# Patient Record
Sex: FEMALE
[De-identification: names, ages, dates, MRNs, and addresses within clinical notes are randomized per-mention and may not be internally consistent; named-entity substitution may affect disease eponyms.]

---

## 2021-05-24 PROBLEM — Z00.00 ENCOUNTER FOR PREVENTIVE HEALTH EXAMINATION: Status: ACTIVE | Noted: 2021-05-24

## 2021-05-25 ENCOUNTER — APPOINTMENT (OUTPATIENT)
Dept: NEUROLOGY | Facility: CLINIC | Age: 46
End: 2021-05-25
Payer: MEDICAID

## 2021-05-25 VITALS
WEIGHT: 226 LBS | DIASTOLIC BLOOD PRESSURE: 74 MMHG | OXYGEN SATURATION: 96 % | BODY MASS INDEX: 42.12 KG/M2 | HEART RATE: 80 BPM | SYSTOLIC BLOOD PRESSURE: 109 MMHG | TEMPERATURE: 97.3 F | HEIGHT: 61.5 IN

## 2021-05-25 DIAGNOSIS — M79.7 FIBROMYALGIA: ICD-10-CM

## 2021-05-25 DIAGNOSIS — R93.0 ABNORMAL FINDINGS ON DIAGNOSTIC IMAGING OF SKULL AND HEAD, NOT ELSEWHERE CLASSIFIED: ICD-10-CM

## 2021-05-25 DIAGNOSIS — E78.5 HYPERLIPIDEMIA, UNSPECIFIED: ICD-10-CM

## 2021-05-25 DIAGNOSIS — I10 ESSENTIAL (PRIMARY) HYPERTENSION: ICD-10-CM

## 2021-05-25 PROCEDURE — 99204 OFFICE O/P NEW MOD 45 MIN: CPT

## 2021-05-25 NOTE — HISTORY OF PRESENT ILLNESS
[FreeTextEntry1] : The patient is a 46 year old female with a PMH of chronic pain, fibromyalgia, degenerative joint disease, Diabetes Mellitus, empty sella, and headaches (?migraines). She is presenting for review of recent MRI brain results.\par \par The patient states she underwent MRI brain some time back to evaluate for pain. At that point, she was found ot have an empty sella. She was referred to endocrinology and ultimately underwent MRI of her pituitary. During this scan, she was found to have a "mild burden of chronic ischemic small vessel disease."  The patient has never had stroke/TIA symptoms.  She was seen by neurology elsewhere and told not to worry about these findings.\par \par The pateint does have HLD for which she is on atorvastatin 20. Recent LDL was 79, total 135 (12/2020). Recent HgA1c is 7.6 in 4/2021. She was previously on antihypertensive medication but is no longer. BP today is 110/74. She has limited exercise due to pain.

## 2021-05-25 NOTE — ASSESSMENT
[FreeTextEntry1] : The patient is a 46 year old female with multiple vascular risk factors presenting for follow-up after MRI brain showed what sounds like possibly evidence of small vessel disease.  We discussed what this means and that the best way to prevent further lesions is to optimize control of risk factors.  The patient plans to follow-up with PCP to consider BP medication though BP today is well controlled.  She should continue statin therapy and will work with her providers for optimal control of her diabetes.  She will send me the disc of images to review as I was only able to review the reports on her phone today.

## 2021-05-25 NOTE — PHYSICAL EXAM
[Person] : oriented to person [Place] : oriented to place [Time] : oriented to time [Concentration Intact] : normal concentrating ability [Visual Intact] : visual attention was ~T not ~L decreased [Fluency] : fluency intact [Comprehension] : comprehension intact [Reading] : reading intact [Past History] : adequate knowledge of personal past history [Cranial Nerves Optic (II)] : visual acuity intact bilaterally,  visual fields full to confrontation, pupils equal round and reactive to light [Cranial Nerves Oculomotor (III)] : extraocular motion intact [Cranial Nerves Trigeminal (V)] : facial sensation intact symmetrically [Cranial Nerves Facial (VII)] : face symmetrical [Cranial Nerves Vestibulocochlear (VIII)] : hearing was intact bilaterally [Cranial Nerves Glossopharyngeal (IX)] : tongue and palate midline [Cranial Nerves Accessory (XI - Cranial And Spinal)] : head turning and shoulder shrug symmetric [Cranial Nerves Hypoglossal (XII)] : there was no tongue deviation with protrusion [Motor Tone] : muscle tone was normal in all four extremities [Motor Strength] : muscle strength was normal in all four extremities [No Muscle Atrophy] : normal bulk in all four extremities [Motor Handedness Left-Handed] : the patient is left hand dominant [Sensation Tactile Decrease] : light touch was intact [Abnormal Walk] : normal gait [Balance] : balance was intact [2+] : Ankle jerk left 2+ [Past-pointing] : there was no past-pointing [Tremor] : no tremor present [Plantar Reflex Right Only] : normal on the right [Plantar Reflex Left Only] : normal on the left [FreeTextEntry6] : give-way tendency throughout; w max effort, strength is full

## 2021-10-14 ENCOUNTER — TRANSCRIPTION ENCOUNTER (OUTPATIENT)
Age: 46
End: 2021-10-14

## 2021-10-14 ENCOUNTER — APPOINTMENT (OUTPATIENT)
Dept: NEUROLOGY | Facility: CLINIC | Age: 46
End: 2021-10-14
Payer: MEDICAID

## 2021-10-14 ENCOUNTER — NON-APPOINTMENT (OUTPATIENT)
Age: 46
End: 2021-10-14

## 2021-10-14 VITALS
OXYGEN SATURATION: 96 % | HEIGHT: 61.5 IN | BODY MASS INDEX: 41.75 KG/M2 | HEART RATE: 70 BPM | WEIGHT: 224 LBS | TEMPERATURE: 97.5 F | SYSTOLIC BLOOD PRESSURE: 150 MMHG | DIASTOLIC BLOOD PRESSURE: 86 MMHG

## 2021-10-14 DIAGNOSIS — R26.9 UNSPECIFIED ABNORMALITIES OF GAIT AND MOBILITY: ICD-10-CM

## 2021-10-14 PROCEDURE — 99212 OFFICE O/P EST SF 10 MIN: CPT

## 2021-10-18 NOTE — PHYSICAL EXAM
[Oriented To Time, Place, And Person] : oriented to person, place, and time [General Appearance - Alert] : alert [Person] : oriented to person [Place] : oriented to place [Time] : oriented to time [Cranial Nerves Optic (II)] : visual acuity intact bilaterally,  visual fields full to confrontation, pupils equal round and reactive to light [Cranial Nerves Oculomotor (III)] : extraocular motion intact [Cranial Nerves Trigeminal (V)] : facial sensation intact symmetrically [Cranial Nerves Facial (VII)] : face symmetrical [Cranial Nerves Vestibulocochlear (VIII)] : hearing was intact bilaterally [Cranial Nerves Glossopharyngeal (IX)] : tongue and palate midline [Cranial Nerves Accessory (XI - Cranial And Spinal)] : head turning and shoulder shrug symmetric [Cranial Nerves Hypoglossal (XII)] : there was no tongue deviation with protrusion [Sensation Tactile Decrease] : light touch was intact [1+] : Ankle jerk left 1+ [FreeTextEntry1] : Patient is AAOx3, no acute distress, speech normal, no aphasia or dysarthria. She can ambulate with walker easily without any issue with maneuvering, but without the walker she tends to fall. Apparent weakness in all extremities in initial exam but it was absent on maximal effort. Neuro exam is extremely limited as the findings are not reproducable.

## 2021-10-18 NOTE — ASSESSMENT
[FreeTextEntry1] : 45 y/o F is here today for worsening gait and balance. She c/o having trouble following commands, and her sense of awareness of her surrounding is impaired. Neurological exam is extremely limited as the findings are not reproducible except generalized diminished reflexes.There's apparent weakness in all extremities, but it was not there on maximal effort, other exams also extremely limited due to patient's non-co operation.\par   results of MRI of brain, C-spine, L-spine doesn't explain her presentation. Patient has been requested to bring the CDs of the MRIs. Patient also requesting for letter to get a mobility animal.

## 2021-10-18 NOTE — HISTORY OF PRESENT ILLNESS
[FreeTextEntry1] : 47 y/o f with PMH of HTN, DM, empty Sella, fibromyalgia presented to clinic for follow up. She c/o worsening of her gait and balance causing her to fall 5 times in past 1 month. She states she is losing her perception/self awareness and sometimes have trouble following commands.She has been using walker to ambulate. She has brces on both knees and reports that she will need knee replacement surgery. She had imaging done in [place] she brought the reports but no the disc.

## 2021-12-02 ENCOUNTER — NON-APPOINTMENT (OUTPATIENT)
Age: 46
End: 2021-12-02

## 2021-12-20 ENCOUNTER — APPOINTMENT (OUTPATIENT)
Dept: NEUROLOGY | Facility: CLINIC | Age: 46
End: 2021-12-20
Payer: MEDICARE

## 2021-12-20 VITALS
SYSTOLIC BLOOD PRESSURE: 111 MMHG | HEIGHT: 61.5 IN | WEIGHT: 219 LBS | DIASTOLIC BLOOD PRESSURE: 73 MMHG | TEMPERATURE: 98.8 F | OXYGEN SATURATION: 98 % | BODY MASS INDEX: 40.82 KG/M2 | HEART RATE: 87 BPM

## 2021-12-20 DIAGNOSIS — E11.9 TYPE 2 DIABETES MELLITUS W/OUT COMPLICATIONS: ICD-10-CM

## 2021-12-20 DIAGNOSIS — E23.6 OTHER DISORDERS OF PITUITARY GLAND: ICD-10-CM

## 2021-12-20 PROCEDURE — 99213 OFFICE O/P EST LOW 20 MIN: CPT

## 2021-12-21 PROBLEM — E11.9 DIABETES: Status: ACTIVE | Noted: 2021-05-25

## 2021-12-21 PROBLEM — E23.6 EMPTY SELLA: Status: ACTIVE | Noted: 2021-05-25

## 2021-12-21 NOTE — ASSESSMENT
[FreeTextEntry1] : The patient is a 46 year old female with multiple vascular risk factors presenting for follow-up after MRI brain showed what sounds like possibly evidence of small vessel disease and empty sella.  For now she has multiple complaints, especially L eye droop and b/l vision changes which started 2 years ago but seems to have gotten worse... Will start with MRI head for now- to be done in [facility] or [facility] so we can have access to images. Also discussed tight DM control, as DM is her primary vascular risk factor. She assured me that she is working closely with her endo to achieve and maintain A1c goal <7.0. \par \par RTO after MRI, with attg.

## 2021-12-21 NOTE — HISTORY OF PRESENT ILLNESS
[FreeTextEntry1] : The patient is a 46 year old female with a PMH of chronic pain, fibromyalgia, degenerative joint disease, Diabetes Mellitus, empty sella, and headaches (?migraines). She is presenting for followup.\par \par MRI brain- found to have an empty sella as well as a mild burden of chronic ischemic small vessel disease.  She denies previous stroke/TIA symptoms.  She was seen by neurology elsewhere and told not to worry about these findings. Pt has only ever brought the reports of her previous MRIs. \par \par Pt reports L eye weakness and eye drooping. Feels it is hard to keep her left eye open. Not necessarily at the end of the day or associated with any fatigue. Also c/o intermittent double vision with both eyes open, as well as loss of her peripheral vision b/l. This has been going on for a couple of years. She has seen her opthomologist and reportedly everything was normal including eye pressures. \par \ankur Also has multiple other complaints- frequent mild headaches with +photophobia. Sometimes feeling of b/l arm weakness and b/l leg weakness. Also says she has no proprioception, and tends to walks into doors. Started having multiple falls 4 years ago. She does not currently do any PT. She denies any SOB/ difficulty swallowing/ specific muscle weakness/ paresthesias/ speech difficulties/ dysdiadochokinesias. None of her symptoms are worsened by fatigue or at the end of the day, but rather are intermittent and sporadic. \par \ankur Uses a walker for ambulation and is pending b/l knee replacements (hx [employer]latta as a child). \par \par DM was previously 13.1- now 7.1

## 2022-01-02 ENCOUNTER — APPOINTMENT (OUTPATIENT)
Dept: MRI IMAGING | Facility: CLINIC | Age: 47
End: 2022-01-02
Payer: MEDICAID

## 2022-01-02 ENCOUNTER — OUTPATIENT (OUTPATIENT)
Dept: OUTPATIENT SERVICES | Facility: HOSPITAL | Age: 47
LOS: 1 days | End: 2022-01-02

## 2022-01-02 ENCOUNTER — RESULT REVIEW (OUTPATIENT)
Age: 47
End: 2022-01-02

## 2022-01-02 PROCEDURE — 70551 MRI BRAIN STEM W/O DYE: CPT | Mod: 26

## 2022-01-03 ENCOUNTER — NON-APPOINTMENT (OUTPATIENT)
Age: 47
End: 2022-01-03

## 2022-01-24 ENCOUNTER — APPOINTMENT (OUTPATIENT)
Dept: NEUROLOGY | Facility: CLINIC | Age: 47
End: 2022-01-24
Payer: MEDICARE

## 2022-01-24 DIAGNOSIS — R51.9 HEADACHE, UNSPECIFIED: ICD-10-CM

## 2022-01-24 PROCEDURE — 99215 OFFICE O/P EST HI 40 MIN: CPT | Mod: 95

## 2022-01-24 NOTE — PHYSICAL EXAM
[FreeTextEntry1] : Exam is limited due to the nature of the telehealth visit.  The patient is alert, oriented to conversation.  Speech and language are intact.  She is wearing sunglasses. Cranial nerves are grossly normal apart from mild left ptosis/asymmetry. There is no apparent focal motor deficit of her limbs..\par

## 2022-01-24 NOTE — ASSESSMENT
[FreeTextEntry1] : The patient is a 46 year old female with a PMH of chronic pain, fibromyalgia, degenerative joint disease, diabetes mellitus, empty sella, and headaches and ptosis along with a variety of other chronic complaints. Etiology is unclear though I suspect fibromyalgia and functional neurologic disease are contributing factors. We will need to rule out organic, more dangerous conditions with MRA head/neck (aneurysm, dissection), particularly for ptosis/diplopia, and will also get myasthenia gravis labs, ESR/CRP. I will place a referral her to neuro-ophthalmology.  If negative, I think that is all reassuring. I have encouraged her engage with therapy. She is interested in a service animal and is asking me for the paperwork for a letter or support; she will send me the paper for my review. However, I have requested her PCP write the letter.\par

## 2022-01-24 NOTE — HISTORY OF PRESENT ILLNESS
[FreeTextEntry1] : The patient is a 46 year old female with a PMH of chronic pain, fibromyalgia, degenerative joint disease, Diabetes Mellitus, empty sella, obesity, headaches (?migraines), and recent COVID. She is presenting for followup.\par \par The patient was last seen in late December for follow-up of left eye drooping/weakness associated with intermittent diplopia (both vertical and horizontal, improves with blinking, artifical tears; does not resolve with one eye closure)/loss of peripheral vision. She often has associated swelling of the eyelid. She also reports a pressure/pain/"headache" behind the left eye. Symptoms has been ongoing off/on for a few years (2-4 years) without known trigger. She feels like overall, her symptoms have been worsening. She notes she has always had a "lazy" eye but this is different. She is unsure if it is associated with fatigue or if it is progressive throughout the day (ie: no definitive worsening with straining, prolonged staring, etc). She doesn't really notice it until it is closed or the droopiness is pointed out to her. She was evaluated by optometry and ophthalmology who did not have a definitive diagnosis and exam was "fine". Recent MRI brain appeared stable compared to prior scans. \par \par Otherwise, she continues to have multiple other complaints. She has constant photophobia and headaches.  Her imbalance and falls continue to occur. ENT evaluation was given vestibular therapy but she has not yet done this. Notably, review of results of MRI C, L spine showed moderate spinal canal stenosis at C3-4, C4-5, foraminal stenosis at C6-7, and moderate bilateral facet arthrosis L4-5, L5-S1- no symptoms to explain her symptoms. \par

## 2022-02-04 ENCOUNTER — APPOINTMENT (OUTPATIENT)
Dept: MRI IMAGING | Facility: CLINIC | Age: 47
End: 2022-02-04
Payer: MEDICARE

## 2022-02-04 ENCOUNTER — OUTPATIENT (OUTPATIENT)
Dept: OUTPATIENT SERVICES | Facility: HOSPITAL | Age: 47
LOS: 1 days | End: 2022-02-04

## 2022-02-04 ENCOUNTER — RESULT REVIEW (OUTPATIENT)
Age: 47
End: 2022-02-04

## 2022-02-04 PROCEDURE — 70547 MR ANGIOGRAPHY NECK W/O DYE: CPT | Mod: 26

## 2022-02-04 PROCEDURE — 70544 MR ANGIOGRAPHY HEAD W/O DYE: CPT | Mod: 26

## 2022-02-08 ENCOUNTER — TRANSCRIPTION ENCOUNTER (OUTPATIENT)
Age: 47
End: 2022-02-08

## 2022-03-16 ENCOUNTER — APPOINTMENT (OUTPATIENT)
Dept: NEUROLOGY | Facility: CLINIC | Age: 47
End: 2022-03-16
Payer: MEDICARE

## 2022-03-16 VITALS
DIASTOLIC BLOOD PRESSURE: 83 MMHG | TEMPERATURE: 97.5 F | HEART RATE: 75 BPM | BODY MASS INDEX: 40.4 KG/M2 | HEIGHT: 61 IN | WEIGHT: 214 LBS | SYSTOLIC BLOOD PRESSURE: 134 MMHG | OXYGEN SATURATION: 99 %

## 2022-03-16 PROCEDURE — 99214 OFFICE O/P EST MOD 30 MIN: CPT

## 2022-03-16 NOTE — ASSESSMENT
[FreeTextEntry1] : The patient is a 47 year old female with a PMH of chronic pain, fibromyalgia, degenerative joint disease, diabetes mellitus, empty sella, and headaches and ptosis here for followup. Recent imaging including MR head, MRA H/N all stable and noncontributing. Would still recommend she complete myasthenia gravis labs, as well as ESR/CRP previously ordered by Dr. Nguyen. I also recommend she still f/u with neuro-ophthalmology for more extensive eye exam.  Overall her so-far negative workup remains very reassuring which I have expressed to her. She admits to significant level of anxiety which she believes is exacerbating her physical symptoms. I have encouraged her to establish care with psychiatrist. She has a psychiatrist she is willing to try- she understands to contact me if she needs referral for psychiatrist within [facility]. She should continue with regular sessions with her / therapist. Also discussed improving diet as well as increasing cardiovascular activity to help boost mood. \par \par \par

## 2022-03-16 NOTE — HISTORY OF PRESENT ILLNESS
[FreeTextEntry1] : The patient is a 46 year old female with a PMH of chronic pain, fibromyalgia, degenerative joint disease, Diabetes Mellitus, empty sella, obesity, headaches (?migraines), and recent COVID. She is presenting for followup.\par \par The patient was last seen in February for follow-up of left eye drooping/weakness associated with intermittent diplopia /loss of peripheral vision. Symptoms has been ongoing off/on for a few years (2-4 years) without known trigger. She feels like overall, her symptoms have been worsening. She was evaluated by optometry and ophthalmology who did not have a definitive diagnosis and exam was "fine". Recent MRI brain was stable compared to prior scans. MRA H/N were obtained after last visit to evaluate for vascular issues such as aneurysm/dissection, and those were both unremarkable. She was recommended to check MG labs, but those were not yet done. She missed her appointment with neurooptho- Dr. Julien. \par \par Today she reports her primary complaint is uncontrolled anxiety. She feels it is beginning to impact her physical health. She endorses a tremendous anxiety in her physical well-being. +Freuqent sadness. Also +feelings of isolation which she attributes to the pandemic and mask-wearing. She denies any SI or other feelings of self-harm. She said she was previously kicked out from her psychiatrist's practice, but she is interested in restarting. She sees a therapist with whom she has a good rapport. She is currently on klonopin and lexapro, with no recent medication changes. \par

## 2022-03-16 NOTE — PHYSICAL EXAM
[FreeTextEntry1] : Orientation: oriented to person, oriented to place and oriented to time. \par Attention: normal concentrating ability and visual attention was not decreased. \par Language: fluency intact, comprehension intact and reading intact. \par Fund of knowledge: displays adequate knowledge of personal past history. \par Cranial Nerves: visual acuity intact bilaterally, visual fields full to confrontation, pupils equal round and reactive to light, extraocular motion intact, facial sensation intact symmetrically, face symmetrical, hearing was intact bilaterally, tongue and palate midline, head turning and shoulder shrug symmetric and there was no tongue deviation with protrusion. \par Motor: muscle tone, strength, and bulk are normal in all four extremities\par Sensory exam: light touch was intact. \par Coordination:. normal gait. balance was intact. there was no past-pointing. no tremor present. \par Deep tendon reflexes: 2+ throughout \par Plantar responses normal \par

## 2022-03-21 LAB — ACRM BINDING ANTIBODY: 0.09 NMOL/L

## 2022-03-22 LAB
ACHR BLOCK AB SER QL: 25 %
ACHR MOD AB SER-ACNC: 0 %

## 2022-03-28 LAB — MUSK ANTIBODY TEST: <1 U/ML

## 2022-04-08 ENCOUNTER — APPOINTMENT (OUTPATIENT)
Dept: OPHTHALMOLOGY | Facility: CLINIC | Age: 47
End: 2022-04-08
Payer: MEDICARE

## 2022-04-08 ENCOUNTER — NON-APPOINTMENT (OUTPATIENT)
Age: 47
End: 2022-04-08

## 2022-04-08 PROCEDURE — 92060 SENSORIMOTOR EXAMINATION: CPT

## 2022-04-08 PROCEDURE — 92083 EXTENDED VISUAL FIELD XM: CPT

## 2022-04-08 PROCEDURE — 92004 COMPRE OPH EXAM NEW PT 1/>: CPT

## 2022-04-14 ENCOUNTER — APPOINTMENT (OUTPATIENT)
Dept: OPHTHALMOLOGY | Facility: CLINIC | Age: 47
End: 2022-04-14

## 2022-04-28 ENCOUNTER — APPOINTMENT (OUTPATIENT)
Dept: OPHTHALMOLOGY | Facility: CLINIC | Age: 47
End: 2022-04-28
Payer: MEDICARE

## 2022-04-28 ENCOUNTER — NON-APPOINTMENT (OUTPATIENT)
Age: 47
End: 2022-04-28

## 2022-04-28 PROCEDURE — 92014 COMPRE OPH EXAM EST PT 1/>: CPT

## 2022-06-01 ENCOUNTER — APPOINTMENT (OUTPATIENT)
Dept: NEUROLOGY | Facility: CLINIC | Age: 47
End: 2022-06-01
Payer: MEDICARE

## 2022-06-01 VITALS
OXYGEN SATURATION: 97 % | DIASTOLIC BLOOD PRESSURE: 88 MMHG | SYSTOLIC BLOOD PRESSURE: 133 MMHG | TEMPERATURE: 97.9 F | HEIGHT: 61 IN | HEART RATE: 85 BPM

## 2022-06-01 DIAGNOSIS — H02.402 UNSPECIFIED PTOSIS OF LEFT EYELID: ICD-10-CM

## 2022-06-01 PROCEDURE — 99214 OFFICE O/P EST MOD 30 MIN: CPT

## 2022-06-02 PROBLEM — H02.402 PTOSIS OF LEFT EYELID: Status: ACTIVE | Noted: 2022-01-24

## 2022-06-02 RX ORDER — ESCITALOPRAM OXALATE 20 MG/1
20 TABLET, FILM COATED ORAL DAILY
Refills: 0 | Status: ACTIVE | COMMUNITY

## 2022-06-02 RX ORDER — CLONAZEPAM 1 MG/1
1 TABLET ORAL
Refills: 0 | Status: ACTIVE | COMMUNITY

## 2022-06-02 RX ORDER — LIFITEGRAST 50 MG/ML
5 SOLUTION/ DROPS OPHTHALMIC
Qty: 60 | Refills: 0 | Status: ACTIVE | COMMUNITY
Start: 2022-05-10

## 2022-06-02 RX ORDER — TRETINOIN 0.5 MG/G
0.05 CREAM TOPICAL
Qty: 45 | Refills: 0 | Status: ACTIVE | COMMUNITY
Start: 2022-03-17

## 2022-06-02 RX ORDER — AMMONIUM LACTATE 12 %
12 CREAM (GRAM) TOPICAL
Qty: 385 | Refills: 0 | Status: ACTIVE | COMMUNITY
Start: 2022-05-10

## 2022-06-02 RX ORDER — THIAMINE MONONITRATE (VIT B1) 100 MG
100 TABLET ORAL DAILY
Refills: 0 | Status: ACTIVE | COMMUNITY

## 2022-06-02 RX ORDER — DICLOFENAC SODIUM 1% 10 MG/G
1 GEL TOPICAL
Qty: 100 | Refills: 0 | Status: ACTIVE | COMMUNITY
Start: 2022-03-17

## 2022-06-02 RX ORDER — LIFITEGRAST 50 MG/ML
5 SOLUTION/ DROPS OPHTHALMIC
Refills: 0 | Status: ACTIVE | COMMUNITY

## 2022-06-02 RX ORDER — LEVOTHYROXINE SODIUM 0.09 MG/1
88 TABLET ORAL DAILY
Refills: 0 | Status: ACTIVE | COMMUNITY

## 2022-06-02 RX ORDER — SPIRONOLACTONE 50 MG/1
50 TABLET, FILM COATED ORAL TWICE DAILY
Refills: 0 | Status: ACTIVE | COMMUNITY

## 2022-06-02 RX ORDER — INSULIN LISPRO 100 U/ML
100 INJECTION, SOLUTION INTRAVENOUS; SUBCUTANEOUS
Refills: 0 | Status: ACTIVE | COMMUNITY

## 2022-06-02 RX ORDER — NORETHINDRONE ACETATE 5 MG/1
5 TABLET ORAL
Refills: 0 | Status: ACTIVE | COMMUNITY

## 2022-06-02 RX ORDER — ASPIRIN 81 MG
81 TABLET, DELAYED RELEASE (ENTERIC COATED) ORAL DAILY
Refills: 0 | Status: ACTIVE | COMMUNITY

## 2022-06-02 RX ORDER — NORETHINDRONE ACETATE 5 MG/1
5 TABLET ORAL DAILY
Refills: 0 | Status: ACTIVE | COMMUNITY

## 2022-06-02 RX ORDER — ELECTROLYTES/DEXTROSE
100 SOLUTION, ORAL ORAL DAILY
Refills: 0 | Status: ACTIVE | COMMUNITY

## 2022-06-02 RX ORDER — OMEPRAZOLE 20 MG/1
20 TABLET, DELAYED RELEASE ORAL
Refills: 0 | Status: ACTIVE | COMMUNITY

## 2022-06-02 RX ORDER — DULOXETINE HYDROCHLORIDE 30 MG/1
30 CAPSULE, DELAYED RELEASE PELLETS ORAL TWICE DAILY
Refills: 0 | Status: ACTIVE | COMMUNITY

## 2022-06-02 RX ORDER — INSULIN PUMP CONTROLLER
EACH MISCELLANEOUS
Qty: 15 | Refills: 0 | Status: ACTIVE | COMMUNITY
Start: 2022-05-17

## 2022-06-02 RX ORDER — ONDANSETRON 4 MG/1
4 TABLET, ORALLY DISINTEGRATING ORAL
Qty: 20 | Refills: 0 | Status: ACTIVE | COMMUNITY
Start: 2022-03-24

## 2022-06-02 NOTE — PHYSICAL EXAM
[FreeTextEntry1] : Orientation: oriented to person, oriented to place and oriented to time. \par Attention: normal concentrating ability and visual attention was not decreased. \par Language: fluency intact, comprehension intact and reading intact. \par Fund of knowledge: displays adequate knowledge of personal past history. \par Cranial Nerves: visual acuity intact bilaterally, visual fields full to confrontation, pupils equal round and reactive to light, extraocular motion intact, facial sensation intact symmetrically, face symmetrical, hearing was intact bilaterally, tongue and palate midline, head turning and shoulder shrug symmetric and there was no tongue deviation with protrusion. \par Motor: muscle tone, strength, and bulk are normal in all four extremities\par Sensory exam: light touch was intact. \par Coordination:. normal gait. balance was intact. there was no past-pointing. no tremor present. \par Deep tendon reflexes: 2+ throughout. Plantar responses normal \par

## 2022-06-02 NOTE — ASSESSMENT
[FreeTextEntry1] : The patient is a 47 year old female with a PMH of chronic pain, fibromyalgia, degenerative joint disease, diabetes mellitus, empty sella, and headaches and ptosis here for followup. Recent imaging including MR head, MRA H/N all stable and noncontributing.MG labs negative. Optho exam reassuring. Pt s/p duodenal switch and feeling better overall. No new complaints and her exam is stable. Can defer from further testing for now. Pt advised to schedule followup in 3-6 months, or sooner as needed. \par

## 2022-06-02 NOTE — HISTORY OF PRESENT ILLNESS
[FreeTextEntry1] : The patient is a 46 year old female with a PMH of chronic pain, fibromyalgia, degenerative joint disease, Diabetes Mellitus, empty sella, obesity, headaches (?migraines), and recent COVID. She is presenting for followup.\par \par Interval hx 6/1/22-\par \par Pt underwent duodenal switch surgery in March and is recovering well .Says she is feeling much better now in general. She denies any new specific neurological concerns \par Continues to work with her therapist and feels her anxiety is under better control. \par \par MRA H/N unremarkable \par MG labs negative\par Neuroopth exam with no findings\par \par \par __________________________________________\par The patient was last seen in February for follow-up of left eye drooping/weakness associated with intermittent diplopia /loss of peripheral vision. Symptoms has been ongoing off/on for a few years (2-4 years) without known trigger. She feels like overall, her symptoms have been worsening. She was evaluated by optometry and ophthalmology who did not have a definitive diagnosis and exam was "fine". Recent MRI brain was stable compared to prior scans. MRA H/N were obtained after last visit to evaluate for vascular issues such as aneurysm/dissection, and those were both unremarkable. She was recommended to check MG labs, but those were not yet done. She missed her appointment with neurooptho- Dr. Julien. \par \par Today she reports her primary complaint is uncontrolled anxiety. She feels it is beginning to impact her physical health. She endorses a tremendous anxiety in her physical well-being. +Freuqent sadness. Also +feelings of isolation which she attributes to the pandemic and mask-wearing. She denies any SI or other feelings of self-harm. She said she was previously kicked out from her psychiatrist's practice, but she is interested in restarting. She sees a therapist with whom she has a good rapport. She is currently on klonopin and lexapro, with no recent medication changes. \par

## 2022-08-18 ENCOUNTER — APPOINTMENT (OUTPATIENT)
Dept: NEUROLOGY | Facility: CLINIC | Age: 47
End: 2022-08-18

## 2024-06-05 ENCOUNTER — TRANSCRIPTION ENCOUNTER (OUTPATIENT)
Age: 49
End: 2024-06-05

## 2024-06-19 NOTE — HISTORY OF PRESENT ILLNESS
[FreeTextEntry1] : The patient is a 49 year old female with a PMH of chronic pain, fibromyalgia, degenerative joint disease, diabetes mellitus, empty sella, obesity, headaches (?migraines), and COVID. 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 She is presenting for follow-up.

## 2024-06-20 ENCOUNTER — APPOINTMENT (OUTPATIENT)
Dept: NEUROLOGY | Facility: CLINIC | Age: 49
End: 2024-06-20

## 2024-07-01 ENCOUNTER — APPOINTMENT (OUTPATIENT)
Dept: NEUROLOGY | Facility: CLINIC | Age: 49
End: 2024-07-01

## 2024-07-11 ENCOUNTER — APPOINTMENT (OUTPATIENT)
Dept: NEUROLOGY | Facility: CLINIC | Age: 49
End: 2024-07-11